# Patient Record
Sex: MALE | Race: WHITE | NOT HISPANIC OR LATINO | Employment: FULL TIME | URBAN - METROPOLITAN AREA
[De-identification: names, ages, dates, MRNs, and addresses within clinical notes are randomized per-mention and may not be internally consistent; named-entity substitution may affect disease eponyms.]

---

## 2024-09-24 ENCOUNTER — HOSPITAL ENCOUNTER (EMERGENCY)
Facility: CLINIC | Age: 34
Discharge: HOME | End: 2024-09-24
Attending: EMERGENCY MEDICINE | Admitting: EMERGENCY MEDICINE
Payer: COMMERCIAL

## 2024-09-24 VITALS
DIASTOLIC BLOOD PRESSURE: 79 MMHG | OXYGEN SATURATION: 99 % | SYSTOLIC BLOOD PRESSURE: 132 MMHG | BODY MASS INDEX: 28.63 KG/M2 | HEART RATE: 71 BPM | HEIGHT: 70 IN | WEIGHT: 200 LBS | TEMPERATURE: 97.5 F | RESPIRATION RATE: 16 BRPM

## 2024-09-24 DIAGNOSIS — R19.5 DARK STOOLS: ICD-10-CM

## 2024-09-24 LAB
ALBUMIN SERPL BCG-MCNC: 4.6 G/DL (ref 3.5–5.2)
ALP SERPL-CCNC: 96 U/L (ref 40–150)
ALT SERPL W P-5'-P-CCNC: 24 U/L (ref 0–70)
ANION GAP SERPL CALCULATED.3IONS-SCNC: 10 MMOL/L (ref 7–15)
AST SERPL W P-5'-P-CCNC: 17 U/L (ref 0–45)
BASOPHILS # BLD AUTO: 0.1 10E3/UL (ref 0–0.2)
BASOPHILS NFR BLD AUTO: 1 %
BILIRUB SERPL-MCNC: 0.6 MG/DL
BUN SERPL-MCNC: 10.2 MG/DL (ref 6–20)
CALCIUM SERPL-MCNC: 9.3 MG/DL (ref 8.8–10.4)
CHLORIDE SERPL-SCNC: 102 MMOL/L (ref 98–107)
CREAT SERPL-MCNC: 1 MG/DL (ref 0.67–1.17)
EGFRCR SERPLBLD CKD-EPI 2021: >90 ML/MIN/1.73M2
EOSINOPHIL # BLD AUTO: 0.4 10E3/UL (ref 0–0.7)
EOSINOPHIL NFR BLD AUTO: 5 %
ERYTHROCYTE [DISTWIDTH] IN BLOOD BY AUTOMATED COUNT: 12.7 % (ref 10–15)
GLUCOSE SERPL-MCNC: 98 MG/DL (ref 70–99)
HCO3 SERPL-SCNC: 28 MMOL/L (ref 22–29)
HCT VFR BLD AUTO: 42.4 % (ref 40–53)
HGB BLD-MCNC: 14.2 G/DL (ref 13.3–17.7)
IMM GRANULOCYTES # BLD: 0 10E3/UL
IMM GRANULOCYTES NFR BLD: 0 %
LYMPHOCYTES # BLD AUTO: 2.1 10E3/UL (ref 0.8–5.3)
LYMPHOCYTES NFR BLD AUTO: 29 %
MCH RBC QN AUTO: 27.1 PG (ref 26.5–33)
MCHC RBC AUTO-ENTMCNC: 33.5 G/DL (ref 31.5–36.5)
MCV RBC AUTO: 81 FL (ref 78–100)
MONOCYTES # BLD AUTO: 0.4 10E3/UL (ref 0–1.3)
MONOCYTES NFR BLD AUTO: 6 %
NEUTROPHILS # BLD AUTO: 4.1 10E3/UL (ref 1.6–8.3)
NEUTROPHILS NFR BLD AUTO: 58 %
NRBC # BLD AUTO: 0 10E3/UL
NRBC BLD AUTO-RTO: 0 /100
PLATELET # BLD AUTO: 183 10E3/UL (ref 150–450)
POTASSIUM SERPL-SCNC: 3.9 MMOL/L (ref 3.4–5.3)
PROT SERPL-MCNC: 6.8 G/DL (ref 6.4–8.3)
RBC # BLD AUTO: 5.24 10E6/UL (ref 4.4–5.9)
SODIUM SERPL-SCNC: 140 MMOL/L (ref 135–145)
WBC # BLD AUTO: 7 10E3/UL (ref 4–11)

## 2024-09-24 PROCEDURE — 84295 ASSAY OF SERUM SODIUM: CPT | Performed by: EMERGENCY MEDICINE

## 2024-09-24 PROCEDURE — 85041 AUTOMATED RBC COUNT: CPT | Performed by: EMERGENCY MEDICINE

## 2024-09-24 PROCEDURE — 99283 EMERGENCY DEPT VISIT LOW MDM: CPT

## 2024-09-24 PROCEDURE — 36415 COLL VENOUS BLD VENIPUNCTURE: CPT | Performed by: EMERGENCY MEDICINE

## 2024-09-24 ASSESSMENT — ACTIVITIES OF DAILY LIVING (ADL): ADLS_ACUITY_SCORE: 35

## 2024-09-24 ASSESSMENT — COLUMBIA-SUICIDE SEVERITY RATING SCALE - C-SSRS
2. HAVE YOU ACTUALLY HAD ANY THOUGHTS OF KILLING YOURSELF IN THE PAST MONTH?: NO
1. IN THE PAST MONTH, HAVE YOU WISHED YOU WERE DEAD OR WISHED YOU COULD GO TO SLEEP AND NOT WAKE UP?: NO
6. HAVE YOU EVER DONE ANYTHING, STARTED TO DO ANYTHING, OR PREPARED TO DO ANYTHING TO END YOUR LIFE?: NO

## 2024-09-24 NOTE — ED TRIAGE NOTES
Pt reports that he has been having dark colored stools over last day. Pt called nurse line who recommenced ED assessment.      Triage Assessment (Adult)       Row Name 09/24/24 0708          Triage Assessment    Airway WDL WDL        Respiratory WDL    Respiratory WDL WDL        Cognitive/Neuro/Behavioral WDL    Cognitive/Neuro/Behavioral WDL WDL

## 2024-09-24 NOTE — ED PROVIDER NOTES
"  Emergency Department Note      History of Present Illness     Chief Complaint   Dark stool    HPI   Vasu Ang is a 34 year old male who presents to the ED for the evaluation of dark stool. The patient reports that he ate Mexican food at a restaurant 2 days ago and felt he cut his tongue, experiencing pain and slight bleeding of the tongue. He has since had 2 dark stools. He took Pepto Bismol yesterday morning and does not remember whether his first dark stool was before or after this. He also reports slight upper abdominal pain that feels more like he needs to use the restroom than pain. He denies taking ibuprofen, blood thinners, or other daily medications. Denies history of melena, ulcerative colitis, Crohn's disease. Denies alcohol use. He has never had a colonoscopy.    Independent Historian   None    Review of External Notes   N/A    Past Medical History     Medical History and Problem List   The patient denies any pertinant past medical history.    Medications   The patient denies current use of prescribed medication.    Surgical History   The patient denies any pertinent past surgical history.    Physical Exam     Patient Vitals for the past 24 hrs:   BP Temp Temp src Pulse Resp SpO2 Height Weight   09/24/24 0712 132/79 97.5  F (36.4  C) Oral 71 16 100 % 1.778 m (5' 10\") 90.7 kg (200 lb)     Physical Exam  General: Alert and cooperative with exam. Patient in mild distress. Normal mentation.  Head:  Scalp is NC/AT  Eyes:  No scleral icterus, PERRL  ENT:  The external nose and ears are normal.   Neck:  Normal range of motion without rigidity.  CV:  Regular rate and rhythm    No pathologic murmur   Resp:  Breath sounds are clear bilaterally    Non-labored, no retractions or accessory muscle use  GI:  Abdomen is soft, no distension, no tenderness. No peritoneal signs  MS:  No lower extremity edema   Skin:  Warm and dry, No rash or lesions noted.  Neuro: Oriented x 3. No gross motor deficits.  Rectal: No " fissure, hemorrhoid, or evidence of bleeding on LIU.    Diagnostics     Lab Results   Labs Ordered and Resulted from Time of ED Arrival to Time of ED Departure   COMPREHENSIVE METABOLIC PANEL - Normal       Result Value    Sodium 140      Potassium 3.9      Carbon Dioxide (CO2) 28      Anion Gap 10      Urea Nitrogen 10.2      Creatinine 1.00      GFR Estimate >90      Calcium 9.3      Chloride 102      Glucose 98      Alkaline Phosphatase 96      AST 17      ALT 24      Protein Total 6.8      Albumin 4.6      Bilirubin Total 0.6     CBC WITH PLATELETS AND DIFFERENTIAL    WBC Count 7.0      RBC Count 5.24      Hemoglobin 14.2      Hematocrit 42.4      MCV 81      MCH 27.1      MCHC 33.5      RDW 12.7      Platelet Count 183      % Neutrophils 58      % Lymphocytes 29      % Monocytes 6      % Eosinophils 5      % Basophils 1      % Immature Granulocytes 0      NRBCs per 100 WBC 0      Absolute Neutrophils 4.1      Absolute Lymphocytes 2.1      Absolute Monocytes 0.4      Absolute Eosinophils 0.4      Absolute Basophils 0.1      Absolute Immature Granulocytes 0.0      Absolute NRBCs 0.0         Imaging   No orders to display     Independent Interpretation   None    ED Course      Medications Administered   Medications - No data to display    Procedures   Procedures     Discussion of Management   None    ED Course   ED Course as of 09/24/24 0826   Tue Sep 24, 2024   0722 I evaluated the patient and obtained history.   0756 I performed a rectal exam and updated the patient.       Additional Documentation  None    Medical Decision Making / Diagnosis     CMS Diagnoses: None    MIPS       None    [facility]   Vasu Ang is a 34 year old male presents with concern for two recent dark stools.  Patient's medical history and records reviewed.  On evaluation patient's abdominal exam is benign.  Vital signs normal.  Patient well-appearing.  Rectal exam is without any evidence of GI bleed and patient's hemoglobin is well within  normal range (14.2).  Patient does endorse recent use of Pepto-Bismol which is likely cause of dark stools.  No reported risk factors for GI bleed.  Recommended discontinuation of Pepto-Bismol, continued close monitoring and close follow-up with PCP if symptoms persist.  Return precautions discussed.  Patient discharged home.    Disposition   The patient was discharged.     Diagnosis     ICD-10-CM    1. Dark stools  R19.5 Primary Care Referral                 Scribe Disclosure:  Siobhan BECKWITH, am serving as a scribe at 7:15 AM on 9/24/2024 to document services personally performed by Nixon Vargas DO, based on my observations and the provider's statements to me.        Nixon Vargas DO  09/24/24 7157

## 2024-10-04 ENCOUNTER — OFFICE VISIT (OUTPATIENT)
Dept: INTERNAL MEDICINE | Facility: CLINIC | Age: 34
End: 2024-10-04
Attending: EMERGENCY MEDICINE
Payer: COMMERCIAL

## 2024-10-04 VITALS
WEIGHT: 176 LBS | DIASTOLIC BLOOD PRESSURE: 80 MMHG | RESPIRATION RATE: 12 BRPM | SYSTOLIC BLOOD PRESSURE: 129 MMHG | HEART RATE: 66 BPM | OXYGEN SATURATION: 97 % | TEMPERATURE: 97.5 F | BODY MASS INDEX: 25.25 KG/M2

## 2024-10-04 DIAGNOSIS — Z11.59 NEED FOR HEPATITIS C SCREENING TEST: ICD-10-CM

## 2024-10-04 DIAGNOSIS — M54.50 ACUTE MIDLINE LOW BACK PAIN WITHOUT SCIATICA: ICD-10-CM

## 2024-10-04 DIAGNOSIS — Z00.00 ROUTINE GENERAL MEDICAL EXAMINATION AT A HEALTH CARE FACILITY: Primary | ICD-10-CM

## 2024-10-04 DIAGNOSIS — Z11.4 SCREENING FOR HIV (HUMAN IMMUNODEFICIENCY VIRUS): ICD-10-CM

## 2024-10-04 DIAGNOSIS — R19.5 DARK STOOLS: ICD-10-CM

## 2024-10-04 LAB
CHOLEST SERPL-MCNC: 238 MG/DL
FASTING STATUS PATIENT QL REPORTED: YES
HDLC SERPL-MCNC: 40 MG/DL
LDLC SERPL CALC-MCNC: 179 MG/DL
NONHDLC SERPL-MCNC: 198 MG/DL
TRIGL SERPL-MCNC: 97 MG/DL
TSH SERPL DL<=0.005 MIU/L-ACNC: 1.58 UIU/ML (ref 0.3–4.2)
VIT D+METAB SERPL-MCNC: 16 NG/ML (ref 20–50)

## 2024-10-04 PROCEDURE — 82306 VITAMIN D 25 HYDROXY: CPT | Performed by: INTERNAL MEDICINE

## 2024-10-04 PROCEDURE — 91320 SARSCV2 VAC 30MCG TRS-SUC IM: CPT | Performed by: INTERNAL MEDICINE

## 2024-10-04 PROCEDURE — 90480 ADMN SARSCOV2 VAC 1/ONLY CMP: CPT | Performed by: INTERNAL MEDICINE

## 2024-10-04 PROCEDURE — 36415 COLL VENOUS BLD VENIPUNCTURE: CPT | Performed by: INTERNAL MEDICINE

## 2024-10-04 PROCEDURE — 90471 IMMUNIZATION ADMIN: CPT | Performed by: INTERNAL MEDICINE

## 2024-10-04 PROCEDURE — 99385 PREV VISIT NEW AGE 18-39: CPT | Mod: 25 | Performed by: INTERNAL MEDICINE

## 2024-10-04 PROCEDURE — 80061 LIPID PANEL: CPT | Performed by: INTERNAL MEDICINE

## 2024-10-04 PROCEDURE — 84443 ASSAY THYROID STIM HORMONE: CPT | Performed by: INTERNAL MEDICINE

## 2024-10-04 PROCEDURE — 90656 IIV3 VACC NO PRSV 0.5 ML IM: CPT | Performed by: INTERNAL MEDICINE

## 2024-10-04 ASSESSMENT — PAIN SCALES - GENERAL: PAINLEVEL: NO PAIN (0)

## 2024-10-04 NOTE — PATIENT INSTRUCTIONS
Patient Education   Preventive Care Advice   This is general advice given by our system to help you stay healthy. However, your care team may have specific advice just for you. Please talk to your care team about your preventive care needs.  Nutrition  Eat 5 or more servings of fruits and vegetables each day.  Try wheat bread, brown rice and whole grain pasta (instead of white bread, rice, and pasta).  Get enough calcium and vitamin D. Check the label on foods and aim for 100% of the RDA (recommended daily allowance).  Lifestyle  Exercise at least 150 minutes each week  (30 minutes a day, 5 days a week).  Do muscle strengthening activities 2 days a week. These help control your weight and prevent disease.  No smoking.  Wear sunscreen to prevent skin cancer.  Have a dental exam and cleaning every 6 months.  Yearly exams  See your health care team every year to talk about:  Any changes in your health.  Any medicines your care team has prescribed.  Preventive care, family planning, and ways to prevent chronic diseases.  Shots (vaccines)   HPV shots (up to age 26), if you've never had them before.  Hepatitis B shots (up to age 59), if you've never had them before.  COVID-19 shot: Get this shot when it's due.  Flu shot: Get a flu shot every year.  Tetanus shot: Get a tetanus shot every 10 years.  Pneumococcal, hepatitis A, and RSV shots: Ask your care team if you need these based on your risk.  Shingles shot (for age 50 and up)  General health tests  Diabetes screening:  Starting at age 35, Get screened for diabetes at least every 3 years.  If you are younger than age 35, ask your care team if you should be screened for diabetes.  Cholesterol test: At age 39, start having a cholesterol test every 5 years, or more often if advised.  Bone density scan (DEXA): At age 50, ask your care team if you should have this scan for osteoporosis (brittle bones).  Hepatitis C: Get tested at least once in your life.  STIs (sexually  transmitted infections)  Before age 24: Ask your care team if you should be screened for STIs.  After age 24: Get screened for STIs if you're at risk. You are at risk for STIs (including HIV) if:  You are sexually active with more than one person.  You don't use condoms every time.  You or a partner was diagnosed with a sexually transmitted infection.  If you are at risk for HIV, ask about PrEP medicine to prevent HIV.  Get tested for HIV at least once in your life, whether you are at risk for HIV or not.  Cancer screening tests  Cervical cancer screening: If you have a cervix, begin getting regular cervical cancer screening tests starting at age 21.  Breast cancer scan (mammogram): If you've ever had breasts, begin having regular mammograms starting at age 40. This is a scan to check for breast cancer.  Colon cancer screening: It is important to start screening for colon cancer at age 45.  Have a colonoscopy test every 10 years (or more often if you're at risk) Or, ask your provider about stool tests like a FIT test every year or Cologuard test every 3 years.  To learn more about your testing options, visit:   .  For help making a decision, visit:   https://bit.ly/mi33252.  Prostate cancer screening test: If you have a prostate, ask your care team if a prostate cancer screening test (PSA) at age 55 is right for you.  Lung cancer screening: If you are a current or former smoker ages 50 to 80, ask your care team if ongoing lung cancer screenings are right for you.  For informational purposes only. Not to replace the advice of your health care provider. Copyright   2023 [employer]. All rights reserved. Clinically reviewed by the [facility] Transitions Program. [employer] [id number] - REV 01/24.

## 2024-10-04 NOTE — PROGRESS NOTES
"  Assessment & Plan     Dark stools  Resolved, was from Pepto Bismol      Routine general medical examination at a health care facility  Screening for HIV (human immunodeficiency virus)  Need for hepatitis C screening test  Screening labs ordered    Acute midline low back pain without sciatica  One episode , no red flags  If recurs can use OTC Advil prn  Advised Physical therapy referral but He declined.            MED REC REQUIRED  Post Medication Reconciliation Status:   BMI  Estimated body mass index is 25.25 kg/m  as calculated from the following:    Height as of 9/24/24: 1.778 m (5' 10\").    Weight as of this encounter: 79.8 kg (176 lb).             Sera Leone is a 34 year old, presenting for the following health issues:  Hospital F/U and Back Pain        10/4/2024     8:41 AM   Additional Questions   Roomed by Irvin HAYWARD       ED/UC Followup:    Facility:  [facility]  Date of visit: 9/24/24  Reason for visit: Dark stool  Current Status: normal stool  Pain History:  When did you first notice your pain? 2 days     Have you seen anyone else for your pain? No  How has your pain affected your ability to work? Pain does not limit ability to work   Where in your body do you have pain? Back Pain  Onset/Duration: 2 days  Description:   Location of pain: middle of back bilateral  Character of pain: sharp and dull ache  Pain radiation: none  New numbness or weakness in legs, not attributed to pain: no   Intensity: Currently 3/10, moderate  Progression of Symptoms: same  History:   Specific cause: none  Pain interferes with job: No  History of back problems: no prior back problems  Any previous MRI or X-rays: None  Sees a specialist for back pain: No  Alleviating factors:   Improved by: n/a    Precipitating factors:  Worsened by: Lifting and Standing  Therapies tried and outcome: n/a    Accompanying Signs & Symptoms:  Risk of Fracture: None  Risk of Cauda Equina: None  Risk of " Infection: None  Risk of Cancer: None  Risk of Ankylosing Spondylitis: Onset at age <35, male, AND morning back stiffness  no                 Also wants to do Physical today.    Has no medical problems and denies any other symptoms other than the episode of the back pain which just happened last night.  He did not injure his back.    The dark stools have resolved.  Was felt to be from Pepto bismol.               Review of Systems  Constitutional, HEENT, cardiovascular, pulmonary, GI, , musculoskeletal, neuro, skin, endocrine and psych systems are negative, except as otherwise noted.      Objective    /80   Pulse 66   Temp 97.5  F (36.4  C) (Temporal)   Resp 12   Wt 79.8 kg (176 lb)   SpO2 97%   BMI 25.25 kg/m    Body mass index is 25.25 kg/m .  Physical Exam   GENERAL: alert and no distress  EYES: Eyes grossly normal to inspection, PERRL and conjunctivae and sclerae normal  HENT: ear canals and TM's normal, nose and mouth without ulcers or lesions  NECK: no adenopathy, no asymmetry, masses, or scars  RESP: lungs clear to auscultation - no rales, rhonchi or wheezes  CV: regular rate and rhythm, normal S1 S2, no S3 or S4, no murmur, click or rub, no peripheral edema  ABDOMEN: soft, nontender, no hepatosplenomegaly, no masses and bowel sounds normal  SLR negative bilaterally  MS: no gross musculoskeletal defects noted, no edema, no tenderness over spine  SKIN: no suspicious lesions or rashes  NEURO: Normal strength and tone, mentation intact and speech normal  PSYCH: mentation appears normal, affect normal/bright            Signed Electronically by: Tomer Shankar MD

## 2024-10-11 ENCOUNTER — TELEPHONE (OUTPATIENT)
Dept: INTERNAL MEDICINE | Facility: CLINIC | Age: 34
End: 2024-10-11
Payer: COMMERCIAL

## 2024-10-11 NOTE — TELEPHONE ENCOUNTER
Spoke to pt and relayed the following message, pt verbalized understanding.        Closing encounter.    Kimberlyn Grace RN

## 2024-12-29 ENCOUNTER — NURSE TRIAGE (OUTPATIENT)
Dept: NURSING | Facility: CLINIC | Age: 34
End: 2024-12-29
Payer: COMMERCIAL

## 2024-12-29 ENCOUNTER — HOSPITAL ENCOUNTER (EMERGENCY)
Facility: CLINIC | Age: 34
Discharge: HOME | End: 2024-12-30
Attending: EMERGENCY MEDICINE | Admitting: EMERGENCY MEDICINE
Payer: COMMERCIAL

## 2024-12-29 ENCOUNTER — NURSE TRIAGE (OUTPATIENT)
Dept: NURSING | Facility: CLINIC | Age: 34
End: 2024-12-29

## 2024-12-29 DIAGNOSIS — J10.1 INFLUENZA A: ICD-10-CM

## 2024-12-29 LAB
FLUAV RNA SPEC QL NAA+PROBE: POSITIVE
FLUBV RNA RESP QL NAA+PROBE: NEGATIVE
RSV RNA SPEC NAA+PROBE: NEGATIVE
SARS-COV-2 RNA RESP QL NAA+PROBE: NEGATIVE

## 2024-12-29 PROCEDURE — 87637 SARSCOV2&INF A&B&RSV AMP PRB: CPT | Performed by: EMERGENCY MEDICINE

## 2024-12-29 PROCEDURE — 99283 EMERGENCY DEPT VISIT LOW MDM: CPT

## 2024-12-29 PROCEDURE — 250N000013 HC RX MED GY IP 250 OP 250 PS 637: Performed by: EMERGENCY MEDICINE

## 2024-12-29 RX ORDER — IBUPROFEN 600 MG/1
600 TABLET, FILM COATED ORAL ONCE
Status: COMPLETED | OUTPATIENT
Start: 2024-12-29 | End: 2024-12-29

## 2024-12-29 RX ADMIN — IBUPROFEN 600 MG: 600 TABLET ORAL at 23:01

## 2024-12-29 ASSESSMENT — COLUMBIA-SUICIDE SEVERITY RATING SCALE - C-SSRS
6. HAVE YOU EVER DONE ANYTHING, STARTED TO DO ANYTHING, OR PREPARED TO DO ANYTHING TO END YOUR LIFE?: NO
1. IN THE PAST MONTH, HAVE YOU WISHED YOU WERE DEAD OR WISHED YOU COULD GO TO SLEEP AND NOT WAKE UP?: NO
2. HAVE YOU ACTUALLY HAD ANY THOUGHTS OF KILLING YOURSELF IN THE PAST MONTH?: NO

## 2024-12-29 ASSESSMENT — ACTIVITIES OF DAILY LIVING (ADL): ADLS_ACUITY_SCORE: 41

## 2024-12-29 NOTE — Clinical Note
Vasu Ang was seen and treated in our emergency department on 12/29/2024.  He may return to work on 01/02/2025.       If you have any questions or concerns, please don't hesitate to call.      Vasu Christensen MD

## 2024-12-30 VITALS
HEIGHT: 70 IN | BODY MASS INDEX: 25.77 KG/M2 | HEART RATE: 82 BPM | RESPIRATION RATE: 14 BRPM | WEIGHT: 180 LBS | SYSTOLIC BLOOD PRESSURE: 104 MMHG | DIASTOLIC BLOOD PRESSURE: 67 MMHG | OXYGEN SATURATION: 95 % | TEMPERATURE: 98.8 F

## 2024-12-30 ASSESSMENT — ACTIVITIES OF DAILY LIVING (ADL): ADLS_ACUITY_SCORE: 41

## 2024-12-30 NOTE — ED PROVIDER NOTES
"  Emergency Department Note      History of Present Illness     Chief Complaint   Flu Symptoms    HPI   Vasu Ang is a 34 year old male with a history of asthma who presents with concern for flu symptoms. He notes he developed a cough yesterday. He has since developed a worsening cough, myalgias, chills, fever, and pain and pressure behind his eyes. He took Acetaminophen for pain. He last took Acetaminophen 5.5 hours ago. He notes he received a flu shot this year. He denies medical problems, daily medications, or allergies.     Independent Historian   None      Past Medical History     Medical History and Problem List   Uncomplicated asthma     Medications   The patient is not currently taking any prescribed medications.    Surgical History   Lasik eye surgery    Physical Exam     Patient Vitals for the past 24 hrs:   BP Temp Temp src Pulse Resp SpO2 Height Weight   12/30/24 0110 104/67 98.8  F (37.1  C) Oral 82 14 95 % -- --   12/29/24 2252 112/70 (!) 101.7  F (38.7  C) Oral 108 18 97 % 1.778 m (5' 10\") 81.6 kg (180 lb)   12/29/24 2250 112/78 (!) 101.7  F (38.7  C) Oral 109 20 96 % 1.778 m (5' 10\") 81.6 kg (180 lb)     Physical Exam  General: Does not appear in acute distress  Head: No signs of trauma.   Mouth/Throat: Oropharynx is clear and moist.   Eyes: Conjunctivae are normal.   Neck: Normal range of motion. No nuchal rigidity.   CV: Normal rate and regular rhythm.    Resp: Effort normal and breath sounds normal. No respiratory distress.   GI: Soft. There is no tenderness.  No rebound or guarding.  Normal bowel sounds.    MSK: Normal range of motion.  Neuro: The patient is alert and oriented. Speech normal.  Skin: Skin is warm and dry. No rash noted.   Psych: normal mood and affect. behavior is normal.       Diagnostics     Lab Results   Labs Ordered and Resulted from Time of ED Arrival to Time of ED Departure   INFLUENZA A/B, RSV AND SARS-COV2 PCR - Abnormal       Result Value    Influenza A PCR Positive " (*)     Influenza B PCR Negative      RSV PCR Negative      SARS CoV2 PCR Negative       Independent Interpretation   None    ED Course      Medications Administered   Medications   ibuprofen (ADVIL/MOTRIN) tablet 600 mg (600 mg Oral $Given 12/29/24 2301)     Discussion of Management   None    ED Course   ED Course as of 12/30/24 1121   Mon Dec 30, 2024   0125 I obtained the patient's history and examined as noted above.      Additional Documentation  None    Medical Decision Making / Diagnosis     CMS Diagnoses: None    MIPS       None    MDM   Vasu Ang is a 34 year old male presents with cough, body aches, and generally not feeling well.  Patient was febrile on arrival but improved with ibuprofen.  His physical exam was overall reassuring.  He is positive for Flu A.  I considered but did not feel that a chest x-ray was necessary.  Patient was recommended supportive care and follow-up instructions.    Disposition   The patient was discharged.     Diagnosis     ICD-10-CM    1. Influenza A  J10.1          Scribe Disclosure:  IGabriella, am serving as a scribe at 1:08 AM on 12/30/2024 to document services personally performed by Vasu Christensen MD based on my observations and the provider's statements to me.           Vasu Christensen MD  12/31/24 0000

## 2024-12-30 NOTE — ED TRIAGE NOTES
Reports cough, body aches, headache, and fever. Called the nurses line who told him to come in because of pain behind his eyes. Took tylenol at 2000.     Triage Assessment (Adult)       Row Name 12/29/24 0991          Triage Assessment    Airway WDL WDL        Respiratory WDL    Respiratory WDL X  cough        Skin Circulation/Temperature WDL    Skin Circulation/Temperature WDL WDL        Cardiac WDL    Cardiac WDL X  mildly tachy        Peripheral/Neurovascular WDL    Peripheral Neurovascular WDL WDL        Cognitive/Neuro/Behavioral WDL    Cognitive/Neuro/Behavioral WDL X  headache

## 2024-12-30 NOTE — TELEPHONE ENCOUNTER
Nurse Triage SBAR    Is this a 2nd Level Triage? YES, LICENSED PRACTITIONER REVIEW IS REQUIRED    Situation:  Body aches, cough and eye pain.     Background:  Per pt's wife, who pt gave verbal consent to speak with, pt has had light coughing since 12/28/2024 and developed body aches, muscle pain and bilateral eye pain early this AM with chills.       Assessment:  Per pt's wife, pt feels warm to touch but they don't have a thermometer at home. Per pt, he has 5/10 muscle pain that is worse on his legs, back and face. Per wife, pt's eye pain is located to the back of the eyes and is currently at 5/10 to both eyes.     Protocol Recommended Disposition:   See HCP Within 4 Hours (Or PCP Triage), Home Care Per call back from on call  at 8:43 PM, pt is to seek care at an urgent care([facility]) for eye pain tonight and [facility]. Pt's wife is called back at 9:04 PM, informed of recommendation above from Dr. Resendiz  [facility] get [facility]. Wife of pt verbalized understanding and agreement with plan of care and no further questions at this time.     Recommendation:      Paged to provider    Does the patient meet one of the following criteria for ADS visit consideration? 16+ years old, with an [facility] PCP     TIP  Providers, please consider if this condition is appropriate for management at one of our Acute and Diagnostic Services sites.     If patient is a good candidate, please use dotphrase <dot>triageresponse and select Refer to ADS to document.      Provider consult indicated.     Reason for page: 2LT     Page sent to Jeremy Bennett.  by RN at 8:39 PM.         Provider, Martín Cruz returning page to Nurse Advisors at 8:43 PM    Provider recommended plan of care: See above note.    Alessia, wife is  notified and verbalized understanding.     MESHA Moncada RN   Reason for Disposition   Muscle aches and  fever from a minor viral illness (e.g., common cold)   [1] Eye pain/discomfort AND [2] more than mild    Additional Information   Negative: Shock suspected (e.g., cold/pale/clammy skin, too weak to stand, low BP, rapid pulse)   Negative: Difficult to awaken or acting confused (e.g., disoriented, slurred speech)   Negative: Sounds like a life-threatening emergency to the triager   Negative: Chest pain   Negative: Lyme disease suspected (e.g., bull's eye rash or tick bite / exposure in past month)   Negative: Rash over large area or most of the body (widespread or generalized)   Negative: Dark (cola or tea-colored) or red-colored urine   Negative: [1] Drinking very little AND [2] dehydration suspected (e.g., no urine > 12 hours, very dry mouth, very lightheaded)   Negative: Patient sounds very sick or weak to the triager   Negative: [1] SEVERE pain (e.g., excruciating, unable to do any normal activities) AND [2] not improved 2 hours after pain medicine   Negative: [1] SEVERE pain AND [2] taking a statin medicine (a lipid or cholesterol lowering drug)   Negative: Fever > 104 F (40 C)   Negative: [1] Fever > 101 F (38.3 C) AND [2] age > 60 years   Negative: [1] Fever > 100.0 F (37.8 C) AND [2] bedridden (e.g., CVA, chronic illness, recovering from surgery)   Negative: [1] Fever > 100.0 F (37.8 C) AND [2] indwelling urinary catheter (e.g., Sandoval, Coude)   Negative: [1] Fever > 100.0 F (37.8 C) AND [2] diabetes mellitus or weak immune system (e.g., HIV positive, cancer chemo, splenectomy, organ transplant, chronic steroids)   Negative: Fever present > 3 days (72 hours)   Negative: [1] Muscle aches are unexplained AND [2] occur within 1 month of a tick bite   Negative: Diabetes mellitus or weak immune system (e.g., HIV positive, cancer chemo, splenectomy, organ transplant, chronic steroids)   Negative: [1] MODERATE pain (e.g., interferes with normal activities) AND [2] present > 3 days   Negative: [1] MILD or MODERATE  "muscle aches or pain AND [2] taking a statin medicine (a lipid or cholesterol lowering drug)   Negative: [1] Age > 50 AND [2] bilateral shoulder pains present > 2 weeks   Negative: [1] MILD pain (e.g., does not interfere with normal activities) AND [2] present > 7 days   Negative: Muscle aches are a chronic symptom (recurrent or ongoing AND present > 4 weeks)   Negative: Body pains are a chronic symptom (recurrent or ongoing AND present > 4 weeks)   Negative: [1] MILD pain (e.g., does not interfere with normal activities) AND [2] present < 7 days   Negative: Followed an eye injury   Negative: Eye pain from chemical in the eye   Negative: Eye pain from foreign body in eye   Negative: [1] Tender, red lump or pimple AND [2] located along the eyelid margin   Negative: Has sinus pain or pressure   Negative: Severe eye pain   Negative: Complete loss of vision in one or both eyes   Negative: [1] Eyelids are very swollen (shut or almost) AND [2] fever   Negative: [1] Eyelid (outer) is very red AND [2] fever   Negative: [1] Foreign body sensation (\"feels like something is in there\") AND [2] irrigation didn't help   Negative: Vomiting   Negative: Ulcer or sore seen on the cornea (clear center part of the eye)   Negative: [1] Recent eye surgery AND [2] increasing eye pain   Negative: [1] Blurred vision AND [2] new or worsening   Negative: Patient sounds very sick or weak to the triager    Protocols used: Muscle Aches and Body Pain-A-AH, Eye Pain-A-AH    "

## 2024-12-30 NOTE — TELEPHONE ENCOUNTER
Pt and his spouse calling back, consent given to speak with the spouse. They state pt was triaged earlier and a provider was paged who advised [facility] and/or ED tonight. They're calling back stating the eye pain went away after the Tylenol and are wondering if pt would still need to go in. The nurse who triaged is on break and the note is incomplete for writer to go off of. Discussed that we could retriage now that the eye pain is gone. As we went to retriage pt then started stating he is still having the eye pain. Writer then discussed if the eye pain is still present that it is still advised pt be seen tonight which will now need to be the ED as [facility] are closed.       Reason for Disposition   Caller has already spoken with another triager and has no further questions.     Pt was already triaged and advised to be seen but calling back with change in symptoms    Protocols used: No Contact or Duplicate Contact Call-A-

## 2024-12-30 NOTE — DISCHARGE INSTRUCTIONS
You can take Tylenol, 1000 mg, and ibuprofen, 600 mg, every 6 hours as needed for fevers, body aches, headache, or generally not feeling well.

## 2024-12-30 NOTE — TELEPHONE ENCOUNTER
Per chart review, pt was seen in the ER on 12/29.     Closing encounter.   Thank you,  Any Hilliard RN

## 2025-05-27 ENCOUNTER — NURSE TRIAGE (OUTPATIENT)
Dept: NURSING | Facility: CLINIC | Age: 35
End: 2025-05-27
Payer: COMMERCIAL

## 2025-05-28 NOTE — TELEPHONE ENCOUNTER
"Nurse Triage SBAR    Is this a 2nd Level Triage? NO    Situation: Pt calling with a head injury while at work today when he hit his head on a \"hefty door\" at work.     Background: No PCP at [facility] for 2LT    Assessment:   At 9am patient walked into a hefty door at work  He hit his forehead above his right eye  Pain right away  Sat on the ground  Right eye was watery  Pain subsided a little  Throughout the day he felt \"off\"; grabbing his head  Residual soreness  When he got done and at home. Right ear is hot and left is not  Pains in hands and feet since head injury; tingling like he hit his funny bone  Numbness of his right foot  Can feel a bump near his eyebrow, but no visible bruise or raised area    Protocol Recommended Disposition:   Go to ED Now, Home Care    Recommendation: ED. Pt was advised to go to  the ED due to tingling pain of his hands and feet since head injury     Reason for Disposition   Scalp swelling, bruise or pain   Sounds like a serious injury to the triager    Additional Information   Negative: [1] ACUTE NEURO SYMPTOM AND [2] present now  (DEFINITION: difficult to awaken OR confused thinking and talking OR slurred speech OR weakness of arms OR unsteady walking)   Negative: Knocked out (unconscious) > 1 minute   Negative: Penetrating head injury (e.g., knife, gun shot wound, metal object)   Negative: [1] Major bleeding (e.g., actively dripping or spurting) AND [2] can't be stopped   Negative: [1] Dangerous mechanism of injury (e.g., MVA, diving, trampoline, contact sports, fall > 10 feet or 3 meters) AND [2] NECK pain AND [3] began < 1 hour after injury   Negative: Seizure (convulsion) occurred  (Exception: Prior history of seizures and now alert and without Acute Neuro Symptoms.)   Negative: Sounds like a life-threatening emergency to the triager   Negative: [1] Diagnosed with concussion AND [2] within last 14 days   Negative: [1] Traumatic brain injury (mTBI; concussion) AND [2] more than 14 " "days since head injury   Negative: Can't remember what happened (amnesia)   Negative: Vomiting once or more   Negative: [1] Loss of vision or double vision AND [2] present now   Negative: Watery or blood-tinged fluid dripping from the NOSE or EARS now  (Exception: Tears from crying or nosebleed from nasal trauma.)   Negative: [1] One or two \"black eyes\" (bruising, purple color of eyelids) AND [2] onset within 24 hours of head injury   Negative: Skin is split open or gaping  (or length > 1/2 inch or 12 mm)   Negative: [1] Bleeding AND [2] won't stop after 10 minutes of direct pressure (using correct technique)   Negative: Large swelling or bruise > 2 inches (5 cm)   Negative: [1] ACUTE NEURO SYMPTOM AND [2] now fine  (DEFINITION: difficult to awaken OR confused thinking and talking OR slurred speech OR weakness of arms OR unsteady walking)   Negative: [1] Knocked out (unconscious) < 1 minute AND [2] now fine   Negative: [1] SEVERE headache AND [2] not improved 2 hours after pain medicine/ice packs   Negative: Dangerous injury (e.g., MVA, diving, trampoline, contact sports, fall > 10 feet or 3 meters) or severe blow from hard object (e.g., golf club or baseball bat)   Negative: Taking Coumadin (warfarin) or other strong blood thinner, or known bleeding disorder (e.g., thrombocytopenia)   Negative: Suspicious history for the injury   Negative: [1] Age over 64 years AND [2] swelling or bruise   Negative: Patient is confused or is an unreliable provider of information (e.g., dementia, severe intellectual disability, alcohol intoxication)   Negative: [1] No prior tetanus shots (or is not fully vaccinated) AND [2] any wound (e.g., cut, scrape)   Negative: [1] HIV positive or severe immunodeficiency (severely weak immune system) AND [2] DIRTY cut or scrape   Negative: [1] Last tetanus shot > 5 years ago AND [2] DIRTY cut or scrape   Negative: [1] Last tetanus shot > 10 years ago AND [2] CLEAN cut or scrape (e.g., object AND " skin were clean)   Negative: [1] After 72 hours AND [2] headache persists    Protocols used: Head Injury--  Raquel Zhu RN   Triage Nurse Advisor on 5/27/2025 at 10:57 PM